# Patient Record
Sex: FEMALE | Race: WHITE | NOT HISPANIC OR LATINO | URBAN - METROPOLITAN AREA
[De-identification: names, ages, dates, MRNs, and addresses within clinical notes are randomized per-mention and may not be internally consistent; named-entity substitution may affect disease eponyms.]

---

## 2019-11-21 ENCOUNTER — APPOINTMENT (RX ONLY)
Dept: URBAN - METROPOLITAN AREA CLINIC 45 | Facility: CLINIC | Age: 59
Setting detail: DERMATOLOGY
End: 2019-11-21

## 2019-11-21 DIAGNOSIS — L259 CONTACT DERMATITIS AND OTHER ECZEMA, UNSPECIFIED CAUSE: ICD-10-CM

## 2019-11-21 DIAGNOSIS — L82.1 OTHER SEBORRHEIC KERATOSIS: ICD-10-CM

## 2019-11-21 DIAGNOSIS — D22 MELANOCYTIC NEVI: ICD-10-CM

## 2019-11-21 DIAGNOSIS — L81.4 OTHER MELANIN HYPERPIGMENTATION: ICD-10-CM

## 2019-11-21 DIAGNOSIS — D18.0 HEMANGIOMA: ICD-10-CM

## 2019-11-21 PROBLEM — D18.01 HEMANGIOMA OF SKIN AND SUBCUTANEOUS TISSUE: Status: ACTIVE | Noted: 2019-11-21

## 2019-11-21 PROBLEM — D22.9 MELANOCYTIC NEVI, UNSPECIFIED: Status: ACTIVE | Noted: 2019-11-21

## 2019-11-21 PROBLEM — L23.9 ALLERGIC CONTACT DERMATITIS, UNSPECIFIED CAUSE: Status: ACTIVE | Noted: 2019-11-21

## 2019-11-21 PROCEDURE — 99214 OFFICE O/P EST MOD 30 MIN: CPT

## 2019-11-21 PROCEDURE — ? PRESCRIPTION

## 2019-11-21 PROCEDURE — ? INVENTORY

## 2019-11-21 PROCEDURE — ? COUNSELING

## 2019-11-21 PROCEDURE — ? MEDICATION COUNSELING

## 2019-11-21 PROCEDURE — ? SUNSCREEN RECOMMENDATIONS

## 2019-11-21 PROCEDURE — ? ADDITIONAL NOTES

## 2019-11-21 RX ORDER — TRIAMCINOLONE ACETONIDE 1 MG/G
CREAM TOPICAL BID
Qty: 1 | Refills: 0 | Status: ERX | COMMUNITY
Start: 2019-11-21

## 2019-11-21 RX ORDER — HYDROXYZINE HYDROCHLORIDE 25 MG/1
TABLET, FILM COATED ORAL QHS
Qty: 30 | Refills: 0 | Status: ERX | COMMUNITY
Start: 2019-11-21

## 2019-11-21 RX ADMIN — HYDROXYZINE HYDROCHLORIDE: 25 TABLET, FILM COATED ORAL at 00:00

## 2019-11-21 RX ADMIN — TRIAMCINOLONE ACETONIDE: 1 CREAM TOPICAL at 00:00

## 2019-11-21 ASSESSMENT — LOCATION SIMPLE DESCRIPTION DERM
LOCATION SIMPLE: RIGHT PRETIBIAL REGION
LOCATION SIMPLE: LEFT UPPER BACK
LOCATION SIMPLE: LEFT PRETIBIAL REGION

## 2019-11-21 ASSESSMENT — LOCATION ZONE DERM
LOCATION ZONE: TRUNK
LOCATION ZONE: LEG

## 2019-11-21 ASSESSMENT — LOCATION DETAILED DESCRIPTION DERM
LOCATION DETAILED: RIGHT PROXIMAL PRETIBIAL REGION
LOCATION DETAILED: LEFT PROXIMAL PRETIBIAL REGION
LOCATION DETAILED: LEFT INFERIOR MEDIAL UPPER BACK

## 2019-11-21 NOTE — PROCEDURE: ADDITIONAL NOTES
Additional Notes: Advised patient to schedule appointment with Dr.Apgar for further evaluation.
Detail Level: Simple

## 2019-11-21 NOTE — PROCEDURE: MEDICATION COUNSELING
Xelmartinz Pregnancy And Lactation Text: This medication is Pregnancy Category D and is not considered safe during pregnancy.  The risk during breast feeding is also uncertain.

## 2019-11-21 NOTE — HPI: EVALUATION OF SKIN LESION(S)
What Type Of Note Output Would You Prefer (Optional)?: Bullet Format
Hpi Title: Evaluation of Skin Lesions
Have Your Spot(S) Been Treated In The Past?: has not been treated
Additional History: Last seen by PMM 06/2017

## 2020-06-02 ENCOUNTER — APPOINTMENT (RX ONLY)
Dept: URBAN - METROPOLITAN AREA CLINIC 45 | Facility: CLINIC | Age: 60
Setting detail: DERMATOLOGY
End: 2020-06-02

## 2020-06-02 DIAGNOSIS — L259 CONTACT DERMATITIS AND OTHER ECZEMA, UNSPECIFIED CAUSE: ICD-10-CM | Status: RESOLVING

## 2020-06-02 DIAGNOSIS — K12.0 RECURRENT ORAL APHTHAE: ICD-10-CM

## 2020-06-02 PROBLEM — L23.9 ALLERGIC CONTACT DERMATITIS, UNSPECIFIED CAUSE: Status: ACTIVE | Noted: 2020-06-02

## 2020-06-02 PROCEDURE — ? DEFER

## 2020-06-02 PROCEDURE — ? COUNSELING

## 2020-06-02 PROCEDURE — ? PRESCRIPTION

## 2020-06-02 PROCEDURE — ? ADDITIONAL NOTES

## 2020-06-02 PROCEDURE — 99214 OFFICE O/P EST MOD 30 MIN: CPT

## 2020-06-02 PROCEDURE — ? TREATMENT REGIMEN

## 2020-06-02 PROCEDURE — ? MEDICATION COUNSELING

## 2020-06-02 PROCEDURE — ? FULL BODY SKIN EXAM - DECLINED

## 2020-06-02 RX ORDER — CLOBETASOL PROPIONATE 0.5 MG/G
1 GEL TOPICAL
Qty: 1 | Refills: 0 | Status: ERX | COMMUNITY
Start: 2020-06-02

## 2020-06-02 RX ADMIN — CLOBETASOL PROPIONATE 1: 0.5 GEL TOPICAL at 00:00

## 2020-06-02 ASSESSMENT — LOCATION SIMPLE DESCRIPTION DERM: LOCATION SIMPLE: RIGHT BUCCAL MUCOSA

## 2020-06-02 ASSESSMENT — LOCATION ZONE DERM: LOCATION ZONE: MUCOUS_MEMBRANE

## 2020-06-02 ASSESSMENT — LOCATION DETAILED DESCRIPTION DERM: LOCATION DETAILED: RIGHT BUCCAL MUCOSA

## 2020-06-02 NOTE — PROCEDURE: MEDICATION COUNSELING
car Cellcept Counseling:  I discussed with the patient the risks of mycophenolate mofetil including but not limited to infection/immunosuppression, GI upset, hypokalemia, hypercholesterolemia, bone marrow suppression, lymphoproliferative disorders, malignancy, GI ulceration/bleed/perforation, colitis, interstitial lung disease, kidney failure, progressive multifocal leukoencephalopathy, and birth defects.  The patient understands that monitoring is required including a baseline creatinine and regular CBC testing. In addition, patient must alert us immediately if symptoms of infection or other concerning signs are noted.

## 2020-06-02 NOTE — HPI: RASH
What Type Of Note Output Would You Prefer (Optional)?: Bullet Format
How Severe Is Your Rash?: mild
Is This A New Presentation, Or A Follow-Up?: Rash
Additional History: Pt was referred by rheumatologist to have further evaluation , pt has past history for allergic contact dermatitis and history of patch testing 5/28/2017 ( pt has an allergy to fragrance mix and Bacitracin both were Faint +1)

## 2020-06-02 NOTE — PROCEDURE: TREATMENT REGIMEN
Plan: Pt advised to return to office when she has an active lesion so we may perform a biopsy
Detail Level: Zone

## 2020-06-02 NOTE — PROCEDURE: ADDITIONAL NOTES
Detail Level: Simple
Additional Notes: Discussed current rash not likely to be lichen planus; minimal lesions are not compatible with a biopsy currently.\\nShe will return if any other spots flare in order to have a biopsy.
Additional Notes: Intraoral lesions do not appear to be related to lichen planus. \\nTreat ulcers with topical clobetasol gel; stop all abrasive irritating treatments that she has been using to relieve (mouthwash, severe brushing). she will also f/u if new lesions appear that can be biopsied.

## 2020-06-02 NOTE — PROCEDURE: DEFER
Introduction Text (Please End With A Colon): The following procedure was deferred:
Detail Level: Detailed
Procedure To Be Performed At Next Visit: Biopsy by punch method
Instructions (Optional): Pt to call when rash is flare so a biopsy can be performed ok to schedule with any provider with an opening or send a message to Chante and her nurse will schedule

## 2020-06-02 NOTE — PROCEDURE: FULL BODY SKIN EXAM - DECLINED
Continue taking Tylenol in addition to the medication prescribed  
Price (Do Not Change): 0.00
Instructions: This plan will send the code FBSD to the PM system.  DO NOT or CHANGE the price.
Detail Level: Simple

## 2020-07-28 ENCOUNTER — APPOINTMENT (RX ONLY)
Dept: URBAN - METROPOLITAN AREA CLINIC 45 | Facility: CLINIC | Age: 60
Setting detail: DERMATOLOGY
End: 2020-07-28

## 2020-07-28 DIAGNOSIS — L29.89 OTHER PRURITUS: ICD-10-CM

## 2020-07-28 DIAGNOSIS — K12.0 RECURRENT ORAL APHTHAE: ICD-10-CM | Status: RESOLVED

## 2020-07-28 DIAGNOSIS — L29.8 OTHER PRURITUS: ICD-10-CM

## 2020-07-28 PROCEDURE — ? COUNSELING

## 2020-07-28 PROCEDURE — 99213 OFFICE O/P EST LOW 20 MIN: CPT

## 2020-07-28 PROCEDURE — ? ADDITIONAL NOTES

## 2020-07-28 PROCEDURE — ? TREATMENT REGIMEN

## 2020-07-28 PROCEDURE — ? MEDICATION COUNSELING

## 2020-07-28 ASSESSMENT — LOCATION SIMPLE DESCRIPTION DERM
LOCATION SIMPLE: LEFT PRETIBIAL REGION
LOCATION SIMPLE: RIGHT BUCCAL MUCOSA
LOCATION SIMPLE: RIGHT PRETIBIAL REGION
LOCATION SIMPLE: RIGHT UPPER BACK

## 2020-07-28 ASSESSMENT — LOCATION DETAILED DESCRIPTION DERM
LOCATION DETAILED: RIGHT MEDIAL UPPER BACK
LOCATION DETAILED: RIGHT PROXIMAL PRETIBIAL REGION
LOCATION DETAILED: RIGHT BUCCAL MUCOSA
LOCATION DETAILED: LEFT PROXIMAL PRETIBIAL REGION

## 2020-07-28 ASSESSMENT — LOCATION ZONE DERM
LOCATION ZONE: MUCOUS_MEMBRANE
LOCATION ZONE: TRUNK
LOCATION ZONE: LEG

## 2020-07-28 NOTE — PROCEDURE: TREATMENT REGIMEN
Continue Regimen: Clobetasol 0.05% gel to aa of legs bid x2 weeks then prn flares
Detail Level: Zone
Continue Regimen: Clobetasol 0.05% gel inside mouth bid x1 week prn flares

## 2020-07-28 NOTE — PROCEDURE: MEDICATION COUNSELING
Home Skyrizi Counseling: I discussed with the patient the risks of risankizumab-rzaa including but not limited to immunosuppression, and serious infections.  The patient understands that monitoring is required including a PPD at baseline and must alert us or the primary physician if symptoms of infection or other concerning signs are noted.

## 2020-07-28 NOTE — PROCEDURE: ADDITIONAL NOTES
Detail Level: Simple
Additional Notes: Discussed again lack of findings of any lesion tht can be biopsied. She will use moistirizers and apply TAC or clobetasol previously prescribed to any sea itchy spots. Rec antihistamines (Benadryl qHS, Zyrtec qAM) and also discuss with her PCp for other possible causes of itching.

## 2022-08-22 NOTE — PROCEDURE: MEDICATION COUNSELING
Concentration Of Solution Injected (Mg/Ml): 2.5 Erivedge Counseling- I discussed with the patient the risks of Erivedge including but not limited to nausea, vomiting, diarrhea, constipation, weight loss, changes in the sense of taste, decreased appetite, muscle spasms, and hair loss.  The patient verbalized understanding of the proper use and possible adverse effects of Erivedge.  All of the patient's questions and concerns were addressed.

## 2024-01-09 NOTE — PROCEDURE: MEDICATION COUNSELING
09-Jan-2024 19:14 Carac Counseling:  I discussed with the patient the risks of Carac including but not limited to erythema, scaling, itching, weeping, crusting, and pain.